# Patient Record
Sex: FEMALE | Race: BLACK OR AFRICAN AMERICAN | NOT HISPANIC OR LATINO | Employment: FULL TIME | URBAN - METROPOLITAN AREA
[De-identification: names, ages, dates, MRNs, and addresses within clinical notes are randomized per-mention and may not be internally consistent; named-entity substitution may affect disease eponyms.]

---

## 2018-01-12 ENCOUNTER — HOSPITAL ENCOUNTER (EMERGENCY)
Facility: HOSPITAL | Age: 53
Discharge: HOME | End: 2018-01-13
Attending: EMERGENCY MEDICINE

## 2018-01-12 DIAGNOSIS — N99.820 POSTOPERATIVE HEMORRHAGE INVOLVING GENITOURINARY SYSTEM FOLLOWING GENITOURINARY PROCEDURE: Primary | ICD-10-CM

## 2018-01-12 DIAGNOSIS — N93.9 VAGINAL BLEEDING: ICD-10-CM

## 2018-01-12 DIAGNOSIS — R10.30 LOWER ABDOMINAL PAIN: ICD-10-CM

## 2018-01-12 LAB
ANION GAP SERPL CALC-SCNC: 9 MMOL/L
BACTERIA #/AREA URNS HPF: NORMAL /HPF
BILIRUB UR QL STRIP: NEGATIVE
BUN SERPL-MCNC: 17 MG/DL
CALCIUM SERPL-MCNC: 9.6 MG/DL
CHLORIDE SERPL-SCNC: 111 MMOL/L
CLARITY UR: CLEAR
CO2 SERPL-SCNC: 22 MMOL/L
COLOR UR: YELLOW
CREAT SERPL-MCNC: 0.9 MG/DL
ERYTHROCYTE [DISTWIDTH] IN BLOOD BY AUTOMATED COUNT: 12.9 %
EST. GFR  (AFRICAN AMERICAN): >60 ML/MIN/1.73 M^2
EST. GFR  (NON AFRICAN AMERICAN): >60 ML/MIN/1.73 M^2
GLUCOSE SERPL-MCNC: 135 MG/DL
GLUCOSE UR QL STRIP: NEGATIVE
HCT VFR BLD AUTO: 36.2 %
HGB BLD-MCNC: 11.1 G/DL
HGB UR QL STRIP: ABNORMAL
KETONES UR QL STRIP: NEGATIVE
LEUKOCYTE ESTERASE UR QL STRIP: NEGATIVE
MCH RBC QN AUTO: 28.8 PG
MCHC RBC AUTO-ENTMCNC: 30.7 G/DL
MCV RBC AUTO: 94 FL
MICROSCOPIC COMMENT: NORMAL
NITRITE UR QL STRIP: NEGATIVE
PH UR STRIP: 5 [PH] (ref 5–8)
PLATELET # BLD AUTO: 111 K/UL
PMV BLD AUTO: 11.8 FL
POTASSIUM SERPL-SCNC: 4.2 MMOL/L
PROT UR QL STRIP: NEGATIVE
RBC # BLD AUTO: 3.86 M/UL
RBC #/AREA URNS HPF: 2 /HPF (ref 0–4)
SODIUM SERPL-SCNC: 142 MMOL/L
SP GR UR STRIP: >=1.03 (ref 1–1.03)
URN SPEC COLLECT METH UR: ABNORMAL
UROBILINOGEN UR STRIP-ACNC: NEGATIVE EU/DL
WBC # BLD AUTO: 11.68 K/UL
WBC #/AREA URNS HPF: 0 /HPF (ref 0–5)

## 2018-01-12 PROCEDURE — 96374 THER/PROPH/DIAG INJ IV PUSH: CPT

## 2018-01-12 PROCEDURE — 63600175 PHARM REV CODE 636 W HCPCS: Performed by: EMERGENCY MEDICINE

## 2018-01-12 PROCEDURE — 96375 TX/PRO/DX INJ NEW DRUG ADDON: CPT

## 2018-01-12 PROCEDURE — 81000 URINALYSIS NONAUTO W/SCOPE: CPT

## 2018-01-12 PROCEDURE — 80048 BASIC METABOLIC PNL TOTAL CA: CPT

## 2018-01-12 PROCEDURE — 99284 EMERGENCY DEPT VISIT MOD MDM: CPT | Mod: 25

## 2018-01-12 PROCEDURE — 85027 COMPLETE CBC AUTOMATED: CPT

## 2018-01-12 RX ORDER — FENTANYL CITRATE 50 UG/ML
50 INJECTION, SOLUTION INTRAMUSCULAR; INTRAVENOUS
Status: COMPLETED | OUTPATIENT
Start: 2018-01-12 | End: 2018-01-12

## 2018-01-12 RX ORDER — OXYCODONE AND ACETAMINOPHEN 5; 325 MG/1; MG/1
1 TABLET ORAL EVERY 8 HOURS PRN
Qty: 10 TABLET | Refills: 0 | Status: SHIPPED | OUTPATIENT
Start: 2018-01-12 | End: 2018-01-15

## 2018-01-12 RX ORDER — SERTRALINE HYDROCHLORIDE 100 MG/1
100 TABLET, FILM COATED ORAL DAILY
COMMUNITY

## 2018-01-12 RX ORDER — ONDANSETRON 2 MG/ML
4 INJECTION INTRAMUSCULAR; INTRAVENOUS
Status: COMPLETED | OUTPATIENT
Start: 2018-01-12 | End: 2018-01-12

## 2018-01-12 RX ORDER — LORATADINE 10 MG/1
10 TABLET ORAL DAILY
COMMUNITY

## 2018-01-12 RX ADMIN — ONDANSETRON 4 MG: 2 INJECTION INTRAMUSCULAR; INTRAVENOUS at 08:01

## 2018-01-12 RX ADMIN — FENTANYL CITRATE 50 MCG: 50 INJECTION INTRAMUSCULAR; INTRAVENOUS at 08:01

## 2018-01-13 VITALS
OXYGEN SATURATION: 98 % | SYSTOLIC BLOOD PRESSURE: 145 MMHG | BODY MASS INDEX: 28.25 KG/M2 | WEIGHT: 180 LBS | TEMPERATURE: 99 F | HEART RATE: 75 BPM | RESPIRATION RATE: 12 BRPM | HEIGHT: 67 IN | DIASTOLIC BLOOD PRESSURE: 80 MMHG

## 2018-01-13 NOTE — ED NOTES
APPEARANCE: Alert, oriented and in no acute distress.  CARDIAC: Normal rate and rhythm, no murmur heard.   PERIPHERAL VASCULAR: peripheral pulses present. Normal cap refill. No edema. Warm to touch.    RESPIRATORY:Normal rate and effort, breath sounds clear bilaterally throughout chest. Respirations are equal and unlabored no obvious signs of distress.  GASTRO: soft, bowel sounds normal, no tenderness, no abdominal distention.  MUSC: Full ROM. No bony tenderness or soft tissue tenderness. No obvious deformity.  SKIN: Skin is warm and dry, normal skin turgor, mucous membranes moist.  NEURO: 5/5 strength major flexors/extensors bilaterally. Sensory intact to light touch bilaterally. [place] coma scale: eyes open spontaneously-4, oriented & converses-5, obeys commands-6. No neurological abnormalities.   MENTAL STATUS: awake, alert and aware of environment.  EYE: PERRL, both eyes: pupils brisk and reactive to light. Normal size.  ENT: EARS: no obvious drainage. NOSE: no active bleeding.     Pt presents to ed with c/o vaginal pain and bleeding post intercourse this evening. Patient had robotic hysterectomy done approximately 8 weeks ago. Had sex today and noticed mild amount of bleeding afterward. Also complaining of intermittent nausea and 10/10 pain.

## 2018-01-13 NOTE — ED PROVIDER NOTES
Encounter Date: 1/12/2018       History     Chief Complaint   Patient presents with    Pelvic Pain     Pelvic pain and vaginal bleeding after sexual intercourse. States she is 8 weeks s/p robotic hysterectomy in [place]. No bleeding or pain prior to intercourse.      Sabra Casas is a 52 y.o. female who  has a past medical history of Depression.     The patient presents to the ED due to vaginal bleeding.  Patient reports symptoms started about one hour prior to arrival, after having intercourse.  She reports robotic laparoscopic hysterectomy 2 months ago in [place] by her OB/GYN Dr. Moncada in [place].  She had 4 week postop visit which was unremarkable.  She was given pelvic rest instructions until 8 weeks postop.  She states during intercourse, she felt sudden onset of lower abdominal pain and noticed bleeding afterward.  She states the bleeding was about the amount of the beginning of a menstrual cycle.  She denies any associated nausea/vomiting, fever, flank pain, or other areas of pain. She called the on-call line and was instructed to present to Ed. She also states she has an appointment for Monday for follow-up after she called the on-call line.            Review of patient's allergies indicates:  No Known Allergies  Past Medical History:   Diagnosis Date    Depression      Past Surgical History:   Procedure Laterality Date    HYSTERECTOMY       History reviewed. No pertinent family history.  Social History   Substance Use Topics    Smoking status: Never Smoker    Smokeless tobacco: Never Used    Alcohol use Yes     Review of Systems   Constitutional: Negative for chills and fever.   HENT: Negative for sore throat.    Respiratory: Negative for cough and shortness of breath.    Cardiovascular: Negative for chest pain.   Gastrointestinal: Negative for nausea and vomiting.   Genitourinary: Positive for dyspareunia, vaginal bleeding and vaginal pain. Negative for dysuria, flank pain, frequency,  urgency and vaginal discharge.   Musculoskeletal: Negative for back pain.   Skin: Negative for rash and wound.   Neurological: Negative for syncope and weakness.   Hematological: Does not bruise/bleed easily.   Psychiatric/Behavioral: Negative for agitation, behavioral problems and confusion.       Physical Exam     Initial Vitals [01/12/18 1900]   BP Pulse Resp Temp SpO2   132/77 78 16 98.2 °F (36.8 °C) 100 %      MAP       95.33         Physical Exam    Nursing note and vitals reviewed.  Constitutional: She appears well-developed and well-nourished. She is not diaphoretic. No distress.   HENT:   Head: Normocephalic and atraumatic.   Mouth/Throat: Oropharynx is clear and moist.   Eyes: EOM are normal. Pupils are equal, round, and reactive to light.   Neck: No tracheal deviation present.   Cardiovascular: Normal rate, regular rhythm, normal heart sounds and intact distal pulses.   Pulmonary/Chest: Breath sounds normal. No stridor. No respiratory distress. She has no wheezes.   Abdominal: Soft. Bowel sounds are normal. She exhibits no distension and no mass. There is no tenderness.   Genitourinary: There is no tenderness on the right labia. There is no tenderness on the left labia. There is bleeding (small amount of dark red blood in vault, no active bleeding) in the vagina. No erythema or tenderness in the vagina. No foreign body in the vagina. No vaginal discharge found.   Genitourinary Comments: Sutures located to bilateral superior aspect of vagina, no loose suture, active bleeding, or dehiscence noted   Musculoskeletal: Normal range of motion. She exhibits no edema.   Neurological: She is alert and oriented to person, place, and time. She has normal strength. No cranial nerve deficit or sensory deficit.   Skin: Skin is warm and dry. Capillary refill takes less than 2 seconds. No pallor.   Psychiatric: She has a normal mood and affect. Her behavior is normal. Thought content normal.         ED Course    Procedures  Labs Reviewed   CBC WITHOUT DIFFERENTIAL - Abnormal; Notable for the following:        Result Value    RBC 3.86 (*)     Hemoglobin 11.1 (*)     Hematocrit 36.2 (*)     MCHC 30.7 (*)     Platelets 111 (*)     All other components within normal limits   BASIC METABOLIC PANEL - Abnormal; Notable for the following:     Chloride 111 (*)     CO2 22 (*)     Glucose 135 (*)     All other components within normal limits   URINALYSIS - Abnormal; Notable for the following:     Specific Gravity, UA >=1.030 (*)     Occult Blood UA 2+ (*)     All other components within normal limits   URINALYSIS MICROSCOPIC             Medical Decision Making:   Initial Assessment:   51 yo F currently 8 weeks s/p robotic hysterectomy at [facility] by Dr. Moncada in [place] presents with vaginal bleeding and lower abdominal pain after intercourse. Vitals unremarkable. Plan to obtain basic labs and pelvic exam to evaluate bleeding.  Differential Diagnosis:   Differential Diagnosis includes, but is not limited to:  Pregnancy complication, normal menses, STD, foreign body, trauma, post-op complication.    Clinical Tests:   Lab Tests: Ordered and Reviewed  ED Management:  Pelvic exam with intact sutures, no dehiscence appreciated, small amount of dark red blood in vault without active bleeding.  Labs unremarkable.     Patient with significant improvement in pain after medication. No indication for abdominal imaging at this time. Suspect local trauma/irritation to sutures from intercourse.    Patient counseled on pelvic rest, avoidance of foreign bodies including no intercourse until evaluated by OB-GYN. Given strict return precautions including worsening pain, bleeding, symptoms of anemia, or any other concerns.   Upon re-evaluation, the patient's status has improved.    After complete ED evaluation, clinical impression is most consistent with post-op pain.    At this time, I feel there is no emergent condition requiring further evaluation  or admission. I believe the patient is stable for discharge from the ED. The patient and any additional family present were updated with test results, overall clinical impression, and recommended further plan of care. All questions were answered. The patient expressed understanding and agreed with current plan for discharge with OB-GYN follow-up within 1 week. Strict return precautions were provided, including fever, worsening bleeding, return/worsening of current symptoms or any other concerns.                      ED Course      Clinical Impression:   The primary encounter diagnosis was Postoperative hemorrhage involving genitourinary system following genitourinary procedure. Diagnoses of Vaginal bleeding and Lower abdominal pain were also pertinent to this visit.                           Dwain Abernathy MD  01/13/18 [id number]